# Patient Record
Sex: FEMALE | Race: WHITE | NOT HISPANIC OR LATINO | URBAN - METROPOLITAN AREA
[De-identification: names, ages, dates, MRNs, and addresses within clinical notes are randomized per-mention and may not be internally consistent; named-entity substitution may affect disease eponyms.]

---

## 2017-06-27 ENCOUNTER — ALLSCRIPTS OFFICE VISIT (OUTPATIENT)
Dept: OTHER | Facility: OTHER | Age: 47
End: 2017-06-27

## 2017-06-27 DIAGNOSIS — N94.6 DYSMENORRHEA: ICD-10-CM

## 2017-06-27 DIAGNOSIS — R10.32 LEFT LOWER QUADRANT PAIN: ICD-10-CM

## 2017-06-27 DIAGNOSIS — Z12.31 ENCOUNTER FOR SCREENING MAMMOGRAM FOR MALIGNANT NEOPLASM OF BREAST: ICD-10-CM

## 2017-07-11 ENCOUNTER — LAB CONVERSION - ENCOUNTER (OUTPATIENT)
Dept: OTHER | Facility: OTHER | Age: 47
End: 2017-07-11

## 2017-07-11 ENCOUNTER — GENERIC CONVERSION - ENCOUNTER (OUTPATIENT)
Dept: OTHER | Facility: OTHER | Age: 47
End: 2017-07-11

## 2017-07-11 LAB
ADDITIONAL INFORMATION (HISTORICAL): ABNORMAL
ADEQUACY: (HISTORICAL): ABNORMAL
COMMENT (HISTORICAL): ABNORMAL
CYTOTECHNOLOGIST: (HISTORICAL): ABNORMAL
HPV 18/45 (HISTORICAL): NOT DETECTED
HPV MRNA E6/E7 (HISTORICAL): DETECTED
HPV16 (HISTORICAL): NOT DETECTED
INTERPRETATION (HISTORICAL): ABNORMAL
LMP (HISTORICAL): ABNORMAL
PREV. BX: (HISTORICAL): ABNORMAL
PREV. PAP (HISTORICAL): ABNORMAL
REVIEWED BY (HISTORICAL): ABNORMAL
SOURCE (HISTORICAL): ABNORMAL

## 2017-08-02 ENCOUNTER — HOSPITAL ENCOUNTER (OUTPATIENT)
Dept: MAMMOGRAPHY | Facility: HOSPITAL | Age: 47
Discharge: HOME | End: 2017-08-02
Attending: OBSTETRICS & GYNECOLOGY
Payer: COMMERCIAL

## 2017-08-02 ENCOUNTER — GENERIC CONVERSION - ENCOUNTER (OUTPATIENT)
Dept: OTHER | Facility: OTHER | Age: 47
End: 2017-08-02

## 2017-08-02 ENCOUNTER — HOSPITAL ENCOUNTER (OUTPATIENT)
Dept: ULTRASOUND IMAGING | Facility: HOSPITAL | Age: 47
Discharge: HOME | End: 2017-08-02
Attending: OBSTETRICS & GYNECOLOGY
Payer: COMMERCIAL

## 2017-08-02 DIAGNOSIS — Z12.31 ENCOUNTER FOR SCREENING MAMMOGRAM FOR MALIGNANT NEOPLASM OF BREAST: ICD-10-CM

## 2017-08-02 DIAGNOSIS — N94.6 DYSMENORRHEA: ICD-10-CM

## 2017-08-02 DIAGNOSIS — R10.32 LEFT LOWER QUADRANT PAIN: ICD-10-CM

## 2017-08-02 PROCEDURE — 76770 US EXAM ABDO BACK WALL COMP: CPT

## 2017-08-02 PROCEDURE — G0202 SCR MAMMO BI INCL CAD: HCPCS

## 2017-08-02 PROCEDURE — 76856 US EXAM PELVIC COMPLETE: CPT

## 2017-08-02 PROCEDURE — 76830 TRANSVAGINAL US NON-OB: CPT

## 2017-08-07 ENCOUNTER — GENERIC CONVERSION - ENCOUNTER (OUTPATIENT)
Dept: OTHER | Facility: OTHER | Age: 47
End: 2017-08-07

## 2018-01-10 NOTE — RESULT NOTES
Verified Results  (Q) THINPREP TIS PAP AND HPV mRNA E6/E7 REFLEX HPV 16,18/45 [id number] 12:00AM Ray Main     Test Name Result Flag Reference   CLINICAL INFORMATION:      [id number]   LMP:      [id number]   PREV  PAP:      NO ABNL   PREV  BX:      NONE GIVEN   SOURCE:      Cervix, Endocervix   STATEMENT OF ADEQUACY:      Satisfactory for evaluation  Endocervical/transformation zone component  present  INTERPRETATION/RESULT:      Negative for intraepithelial lesion or malignancy  COMMENT:      This Pap test has been evaluated with computer  assisted technology  CYTOTECHNOLOGIST:      ALTON, CT(ASCP)  CT screening location:[address]   HPV mRNA E6/E7 Detected A Not Detected   This test was performed using the APTIMA HPV Assay ([employer] )  This assay detects E6/E7 viral messenger RNA (mRNA) from 14  high-risk HPV types (16,18,31,33,35,39,45,51,52,56,58,59,66,68)  REVIEW CYTOTECHNOLOGIST:      JASON RIVERA(ASCP)  CT screening location: [address] [place] [address] [id number] 12:00AM Ray Main     Test Name Result Flag Reference   HPV 16 RNA NOT DETECTED  NOT DETECTED   HPV 18/45 RNA NOT DETECTED  NOT DETECTED   This test was performed using the APTIMA HPV 16 18/45  genotype assay ([employer] )  The assay can   differentiate HPV 16 from HPV 18 and/or HPV 45, but   does not differentiate between HPV 18 and HPV 45

## 2018-01-10 NOTE — PROGRESS NOTES
Active Problems    1  Bacterial vaginitis (616 10,041 9) (N76 0,A49 9)   2  Dysmenorrhea (625 3) (N94 6)   3  Dyspareunia (625 0) (N94 1)    Current Meds   1  Calcium 500 + D TABS; Therapy: (Recorded:59Kmn8141) to Recorded   2  Fish Oil Concentrate 1000 MG Oral Capsule; Therapy: (Recorded:10Jzz3761) to Recorded   3  Folic + T52 TABS; Therapy: (Recorded:20Qqe5195) to Recorded   4  MetroNIDAZOLE 500 MG Oral Tablet; TAKE 1 TABLET TWICE DAILY UNTIL FINISHED; Therapy: 43XNO0731 to (Tyson Chandler)  Requested for: 23MKP8242; Last   BA:82RBY4114 Ordered   5  Naproxen 500 MG Oral Tablet; TAKE 1 TABLET EVERY 12 HOURS AS NEEDED; Therapy: 15ZCN5661 to ([phone])  Requested for: 80YFL7939; Last   AN:16YYI9577 Ordered   6  NuvaRing 0 12-0 015 MG/24HR Vaginal Ring; insert 1 ring per vagina first three weeks of   each four week cycle; Therapy: 05WUL0603 to (Last Rx:80Bed6651)  Requested for: 22QPU9774 Ordered   7  Vitamin C Effervescent PDEF; VITAMIN C ORAL POWDER EFFERVESCENT IN PACKET   1,000 mg  USE AS DIRECTED; Therapy: (Recorded:74Tvo7617) to Recorded   8  Vitamin E 600 UNIT Oral Capsule; Therapy: (Recorded:01Nvn4287) to Recorded   9  Zinc 50 MG Oral Tablet; Therapy: (Recorded:76Ghk3328) to Recorded    Allergies    1  No Known Drug Allergies    Message   Recorded as Task   Date: 02/01/2016 02:38 PM, Created By: Barbra Goldstein   Task Name: Med Renewal Request   Assigned To: Meghan Harris   Regarding Patient: Lamin Garcia, Status: Active   Comment:    Meghan Harris - 01 Feb 2016 2:38 PM     TASK CREATED  Caller: Self; Renew Medication; [phone] (Home); [phone] (Work)  Pt  called the office stating that she will be visiting her  sometime this month and wanted a script renewed for her nuva ring  She is aware to start the nuva ring now  keep in for 3 weeks and remove for 1 week  Week 5 to start the 2nd ring  Safe for birth control with the 2nd ring   Pt  aware to use condoms before that  Please renew script to her local pharmacy  thanks Eusebio Natarajan - 01 Feb 2016 8:16 PM     TASK REPLIED TO: Previously Assigned To Becky Dejesus  done       Signatures   Electronically signed by : SONIA Avalos ; Feb 2 2016  9:49AM EST                       (Co-participant)

## 2018-01-12 NOTE — RESULT NOTES
Verified Results  [address] [facility] [address] AND BLADDER 2017 09:47AM Bhavana Weiss Order Number: [id number]   Performing Comments: 57 yo  c/o LLQ and flank pain with menses  PLease eval with concer for endometriosis   - Patient Instructions: To schedule this appointment, please contact Central Scheduling at [phone]  Test Name Result Flag Reference   US KIDNEY AND BLADDER (Report)     RENAL ULTRASOUND     INDICATION: Left flank pain  COMPARISON: None  TECHNIQUE:  Ultrasound of the retroperitoneum was performed with a curvilinear transducer utilizing volumetric sweeps and still imaging techniques  FINDINGS:     KIDNEYS:   Symmetric and normal size  Right kidney: 13 3 x 4 4 cm  Normal echogenicity and contour  No suspicious masses detected  No hydronephrosis  No shadowing calculi  No perinephric fluid collections  Left kidney: 12 0 x 4 8 cm  Normal echogenicity and contour  No suspicious masses detected  No hydronephrosis  No shadowing calculi  No perinephric fluid collections  URETERS:   Nonvisualized  BLADDER:    Normally distended  No focal thickening or mass lesions  Bilateral ureteral jets detected  IMPRESSION:     Normal         Workstation performed: [id number]     Signed by: Salazar Emery MD   17     * US PELVIS COMPLETE (TRANSABDOMINAL AND TRANSVAGINAL) [id number] 09:46AM Bhavana Weiss Order Number: [id number]   Performing Comments: 57 yo  c LMP 17 c/o years of LLQ and left flank pain  initially with menses now noticable throughout the cycle but worse with bleeding  Please eval with concern for endometriosis  Minimal response to OTC NSAID's   -    Patient Instructions: To schedule this appointment, please contact Central Scheduling at [phone]       Test Name Result Flag Reference   US PELVIS COMPLETE (TRANSABDOMINAL AND TRANSVAGINAL) (Report)     PELVIC ULTRASOUND, COMPLETE INDICATION: Pelvic pain  COMPARISON: None  TECHNIQUE:  Transabdominal pelvic ultrasound was performed in sagittal and transverse planes with a curvilinear transducer  Additional transvaginal imaging was performed to better evaluate the endometrium and ovaries  Imaging included volumetric    sweeps as well as traditional still imaging technique  FINDINGS:     UTERUS:   The uterus is anteverted in position, measuring 9 6 x 5 1 x 7 0 cm  Contour and echotexture appear normal      The cervix shows no suspicious abnormality  ENDOMETRIUM:    Normal caliber of 13 mm  Homogenous and normal in appearance  OVARIES/ADNEXA:   Right ovary: 1 1 x 2 3 x 1 5 cm  No suspicious right ovarian abnormality  Doppler flow within normal limits  Left ovary: 1 6 x 1 9 x 1 7 cm  No suspicious left ovarian abnormality  Small simple left paraovarian cyst measuring 9 x 6 x 14 mm is identified  Doppler flow within normal limits  No suspicious adnexal mass or loculated collections  There is no free fluid  IMPRESSION:      Small simple left paraovarian cyst, otherwise unremarkable study  Workstation performed: [id number]     Signed by:    Bree Thompson MD   8/2/17

## 2018-01-13 VITALS
HEIGHT: 71 IN | DIASTOLIC BLOOD PRESSURE: 72 MMHG | SYSTOLIC BLOOD PRESSURE: 118 MMHG | BODY MASS INDEX: 35 KG/M2 | WEIGHT: 250 LBS

## 2018-01-14 NOTE — PROGRESS NOTES
Active Problems    1  Bacterial vaginitis (616 10,041 9) (N76 0,A49 9)   2  Dysmenorrhea (625 3) (N94 6)   3  Dyspareunia (625 0) (N94 1)    Current Meds   1  Calcium 500 + D TABS; Therapy: (Recorded:33Egt0171) to Recorded   2  Fish Oil Concentrate 1000 MG Oral Capsule; Therapy: (Recorded:78Fxy4730) to Recorded   3  Folic + E43 TABS; Therapy: (Recorded:91Fhl7716) to Recorded   4  MetroNIDAZOLE 500 MG Oral Tablet; TAKE 1 TABLET TWICE DAILY UNTIL FINISHED; Therapy: 96OMH3897 to (Anupam Zheng)  Requested for: 88AYT1623; Last   RO:94NYO6148 Ordered   5  Naproxen 500 MG Oral Tablet; TAKE 1 TABLET EVERY 12 HOURS AS NEEDED; Therapy: 66JMI3357 to (Ava Ambrocio)  Requested for: 09OZE2972; Last   RT:08YPH5368 Ordered   6  NuvaRing 0 12-0 015 MG/24HR Vaginal Ring; insert 1 ring per vagina first three weeks of   each four week cycle; Therapy: 96LHP3342 to (Last Rx:38Yck0627)  Requested for: 86MEB9130 Ordered   7  Vitamin C Effervescent PDEF; VITAMIN C ORAL POWDER EFFERVESCENT IN PACKET   1,000 mg  USE AS DIRECTED; Therapy: (Recorded:05Fku3635) to Recorded   8  Vitamin E 600 UNIT Oral Capsule; Therapy: (Recorded:91Uqq8022) to Recorded   9  Zinc 50 MG Oral Tablet; Therapy: (Recorded:95Kgl0160) to Recorded    Allergies    1  No Known Drug Allergies    Message   Date: 08 Feb 2016 1:56 PM EST, Recorded By: Trina Acton   Calling For: Meghan Harris   Caller: Saige Pressley, Self   Phone: [phone] (Home), [phone] (Work)   Reason: Renew Medication   Pt  called the stating that the nuva ring is not covered under her insurance  [place] and only needs 2 nuva rings in order to be affective  Pt aware to use condoms during the first ring  Pt  asked about switching to OCP's during her visit [place] of the country  As per [facility] OCP's would not be affective this short notice  As per [facility] to dispense 2 samples 1 time only  Pt  aware and will  this week       Signatures   Electronically signed by : SONIA Harris ; Feb 9 2016  6:46PM EST                       (Co-participant)

## 2018-01-14 NOTE — RESULT NOTES
Verified Results  * MAMMO SCREENING BILATERAL W CAD [id number] 09:46AM Miguel Mcmahan Order Number: [id number]   Performing Comments: 55 yo  c LMP 17 needs screening   s/p bilateral reduction   - Patient Instructions: To schedule this appointment, please contact Central Scheduling at [phone]  Do not wear any perfume, powder, lotion or deodorant    on breast or underarm area  Please bring your doctors order, referral (if needed) and insurance information with you on the day of the test  Failure to bring this information may result in this test being rescheduled  Arrive 15 minutes prior to your appointment time to register  On the day of your test, please bring any prior mammogram or breast studies with you that were not performed at a [facility]  Failure to bring prior exams may result in your test needing to be rescheduled  Test Name Result Flag Reference   MAMMO SCREENING BILATERAL W CAD (Report)     Patient History:   No known family history of cancer  Reductions of both breasts,   Reductions of both breasts,      No Hormone Replacement Therapy   Patient has never smoked  Patient's BMI is 30 0  Reason for exam: screening, asymptomatic  Mammo Screening Bilateral W CAD: 2017 - Check In #:    [de-identified]   Bilateral MLO, CC, and XCCL view(s) were taken  Technologist: SHADY Morrow (R)(M)   Prior study comparison: 2014, mammo diagnostic    bilateral W CAD, performed at [facility]  2014, mammo screening bilateral W CAD, performed at [facility]  2012, mammo screening bilateral W CAD,    performed at [facility]      The breast tissue is heterogeneously dense, potentially limiting    the sensitivity of mammography   Patient risk, included in this    report, assists in determining the appropriate screening regimen    (such as 3-D mammography or the inclusion of automated breast    ultrasound or MRI)  3-D mammography may also remain indicated as    screening  The parenchymal pattern appears stable  No dominant soft tissue    mass or suspicious calcifications are noted  The skin and nipple   contours are within normal limits  No mammographic evidence of malignancy  No    significant changes when compared with prior studies  ACR BI-RADSï¾® Assessments: BiRad:1 - Negative     Recommendation:   Routine screening mammogram in 1 year  Analyzed by CAD     The patient is scheduled in a reminder system  8-10% of cancers will be missed on mammography  Management of a    palpable abnormality must be based on clinical grounds  Patients   will be notified of their results via letter from our facility  Accredited by [employer] of [employer] and FDA       Transcription Location:  [address]: [id number]     Risk Value(s):   Tyrer-Cuzick 10 Year: 2 000%, Tyrer-Cuzick Lifetime: 10 700%,    Myriad Table: 1 5%, YANET 5 Year: 0 8%, NCI Lifetime: 8 5%   Signed by:   Katty Hansen MD   8/3/17

## 2018-01-16 NOTE — MISCELLANEOUS
Message  7/11/17 1835: LMOVM (cell) to call back  PAP wnl, +HPV, nnot 16/18  REc rpt pap 1 yr  [facility]  7/11/17 1840: pt returns call and aware of recommendation for rpt pap 1 yr rather than 3   [facility]      Signatures   Electronically signed by : SONIA Green ; Jul 11 2017  6:41PM EST                       (Author)